# Patient Record
Sex: MALE | Race: WHITE | NOT HISPANIC OR LATINO
[De-identification: names, ages, dates, MRNs, and addresses within clinical notes are randomized per-mention and may not be internally consistent; named-entity substitution may affect disease eponyms.]

---

## 2023-07-27 ENCOUNTER — DASHBOARD ENCOUNTERS (OUTPATIENT)
Age: 68
End: 2023-07-27

## 2023-07-28 ENCOUNTER — OFFICE VISIT (OUTPATIENT)
Dept: URBAN - METROPOLITAN AREA CLINIC 78 | Facility: CLINIC | Age: 68
End: 2023-07-28
Payer: MEDICARE

## 2023-07-28 ENCOUNTER — LAB OUTSIDE AN ENCOUNTER (OUTPATIENT)
Dept: URBAN - METROPOLITAN AREA CLINIC 78 | Facility: CLINIC | Age: 68
End: 2023-07-28

## 2023-07-28 VITALS
DIASTOLIC BLOOD PRESSURE: 78 MMHG | RESPIRATION RATE: 14 BRPM | TEMPERATURE: 97.5 F | HEIGHT: 69 IN | SYSTOLIC BLOOD PRESSURE: 143 MMHG | BODY MASS INDEX: 25.77 KG/M2 | HEART RATE: 65 BPM | WEIGHT: 174 LBS

## 2023-07-28 DIAGNOSIS — R19.7 DIARRHEA: ICD-10-CM

## 2023-07-28 DIAGNOSIS — Z12.11 COLON CANCER SCREENING: ICD-10-CM

## 2023-07-28 PROCEDURE — 99203 OFFICE O/P NEW LOW 30 MIN: CPT | Performed by: INTERNAL MEDICINE

## 2023-07-28 NOTE — HPI-TODAY'S VISIT:
2 m ago pt came up w a stomach bug He had recently returned from [place] He had diarrhea for 3-4 weeks He was treated with Cipro and Flagyl He is feeling 80% better Has occassional abdo discomfort Recent stool studies are neg for any infection Recent COLOGARD 3 m ago is wnl He is taking Culturelle